# Patient Record
Sex: MALE | Race: WHITE | NOT HISPANIC OR LATINO | Employment: OTHER | URBAN - METROPOLITAN AREA
[De-identification: names, ages, dates, MRNs, and addresses within clinical notes are randomized per-mention and may not be internally consistent; named-entity substitution may affect disease eponyms.]

---

## 2020-05-18 DIAGNOSIS — Z00.6 EXAMINATION FOR NORMAL COMPARISON FOR CLINICAL RESEARCH: Primary | ICD-10-CM

## 2020-05-19 ENCOUNTER — OFFICE VISIT (OUTPATIENT)
Dept: PULMONOLOGY | Facility: CLINIC | Age: 70
End: 2020-05-19

## 2020-05-19 VITALS
DIASTOLIC BLOOD PRESSURE: 80 MMHG | SYSTOLIC BLOOD PRESSURE: 124 MMHG | TEMPERATURE: 97.5 F | BODY MASS INDEX: 30.49 KG/M2 | OXYGEN SATURATION: 100 % | WEIGHT: 213 LBS | HEART RATE: 74 BPM | HEIGHT: 70 IN

## 2020-05-19 DIAGNOSIS — K21.9 CHRONIC GERD: ICD-10-CM

## 2020-05-19 DIAGNOSIS — R93.89 ABNORMAL CHEST X-RAY: Primary | ICD-10-CM

## 2020-05-19 DIAGNOSIS — J30.89 SEASONAL AND PERENNIAL ALLERGIC RHINITIS: ICD-10-CM

## 2020-05-19 DIAGNOSIS — J30.2 SEASONAL AND PERENNIAL ALLERGIC RHINITIS: ICD-10-CM

## 2020-05-19 DIAGNOSIS — R06.09 DYSPNEA ON EXERTION: ICD-10-CM

## 2020-05-19 DIAGNOSIS — Z87.891 FORMER SMOKER: ICD-10-CM

## 2020-05-19 DIAGNOSIS — Z86.79 HISTORY OF CORONARY ARTERY DISEASE: ICD-10-CM

## 2020-05-19 DIAGNOSIS — G47.33 OSA (OBSTRUCTIVE SLEEP APNEA): ICD-10-CM

## 2020-05-19 PROCEDURE — 99204 OFFICE O/P NEW MOD 45 MIN: CPT | Performed by: INTERNAL MEDICINE

## 2020-05-19 PROCEDURE — 94375 RESPIRATORY FLOW VOLUME LOOP: CPT | Performed by: INTERNAL MEDICINE

## 2020-05-19 PROCEDURE — 94726 PLETHYSMOGRAPHY LUNG VOLUMES: CPT | Performed by: INTERNAL MEDICINE

## 2020-05-19 PROCEDURE — 94729 DIFFUSING CAPACITY: CPT | Performed by: INTERNAL MEDICINE

## 2020-05-19 RX ORDER — ROSUVASTATIN CALCIUM 20 MG/1
20 TABLET, COATED ORAL DAILY
COMMUNITY
Start: 2020-05-10

## 2020-05-19 RX ORDER — CARVEDILOL 6.25 MG/1
6.25 TABLET ORAL 2 TIMES DAILY
COMMUNITY
Start: 2020-04-27

## 2020-05-19 RX ORDER — LISINOPRIL 5 MG/1
5 TABLET ORAL 2 TIMES DAILY
COMMUNITY
Start: 2020-05-13

## 2020-05-19 RX ORDER — PANTOPRAZOLE SODIUM 40 MG/1
TABLET, DELAYED RELEASE ORAL
COMMUNITY
Start: 2020-04-27

## 2020-05-19 RX ORDER — TRAMADOL HYDROCHLORIDE 50 MG/1
50 TABLET ORAL 3 TIMES DAILY PRN
COMMUNITY
Start: 2020-04-27

## 2020-05-20 DIAGNOSIS — R06.09 DYSPNEA ON EXERTION: Primary | ICD-10-CM

## 2020-05-20 DIAGNOSIS — J84.9 ILD (INTERSTITIAL LUNG DISEASE) (HCC): ICD-10-CM

## 2020-05-20 PROBLEM — Z86.79 HISTORY OF CORONARY ARTERY DISEASE: Status: ACTIVE | Noted: 2020-05-20

## 2020-05-20 PROBLEM — G47.33 OSA (OBSTRUCTIVE SLEEP APNEA): Status: ACTIVE | Noted: 2020-05-20

## 2020-05-20 PROBLEM — J30.2 SEASONAL AND PERENNIAL ALLERGIC RHINITIS: Status: ACTIVE | Noted: 2020-05-20

## 2020-05-20 PROBLEM — K21.9 CHRONIC GERD: Status: ACTIVE | Noted: 2020-05-20

## 2020-05-20 PROBLEM — J30.89 SEASONAL AND PERENNIAL ALLERGIC RHINITIS: Status: ACTIVE | Noted: 2020-05-20

## 2020-05-20 PROBLEM — Z87.891 FORMER SMOKER: Status: ACTIVE | Noted: 2020-05-20

## 2020-05-20 NOTE — PROGRESS NOTES
PULMONARY  NOTE    Chief Complaint     Abnormal chest x-ray, dyspnea, cough, coronary artery disease, perennial rhinitis, reflux    History of Present Illness     70-year-old white male referred for evaluation of dyspnea and an abnormal chest x-ray.    He has a past history of tobacco abuse that consisted of up to 2 packs of cigarettes per day.  He stopped smoking completely in 1985.    He has dyspnea on exertion, not at rest.  Activities such as bending over or trying to go up 1 flight of stairs would result in shortness of breath.  He has had the symptoms for quite a while but he feels that they have gotten worse over the last year.    He has a daily cough, typically in the morning.  He rarely produces sputum.  He has had no hemoptysis.    He has sinus drainage and postnasal drip.  This is a daily issue and he typically takes Benadryl at night.    He also has regular reflux symptoms.  Control of his reflux symptoms is dependent upon regular use of pantoprazole.  He does not follow reflux precautions.    He underwent a chest x-ray which revealed a prominent interstitial pattern and he was referred to our office.    He also has a history of coronary artery disease and has had multiple stents in the past.  Indicates his last stent was about 5 to 6 years ago.  He denies chest pain or palpitations currently    Patient Active Problem List   Diagnosis   • Dyspnea on exertion   • Former smoker (None since 1985)   • History of coronary artery disease (Multiple stents)   • SAROJ (obstructive sleep apnea)   • GERD   • Perennial rhinitis     Allergies no known allergies    Current Outpatient Medications:   •  carvedilol (COREG) 6.25 MG tablet, Take 6.25 mg by mouth 2 (Two) Times a Day., Disp: , Rfl:   •  lisinopril (PRINIVIL,ZESTRIL) 5 MG tablet, Take 5 mg by mouth 2 (Two) Times a Day., Disp: , Rfl:   •  metFORMIN (GLUCOPHAGE) 500 MG tablet, Take 1,500 mg by mouth See Admin Instructions. Take 1 tablet by mouth in the morning  "and 2 at bedtime., Disp: , Rfl:   •  pantoprazole (PROTONIX) 40 MG EC tablet, TAKE 1 TABLET BY MOUTH ONCE DAILY 30 MINUTES PRIOR TO MORNING MEAL CHANGED TO 1 DAILY, Disp: , Rfl:   •  rosuvastatin (CRESTOR) 20 MG tablet, Take 20 mg by mouth Daily., Disp: , Rfl:   •  traMADol (ULTRAM) 50 MG tablet, Take 50 mg by mouth 3 (Three) Times a Day As Needed., Disp: , Rfl:   MEDICATION LIST AND ALLERGIES REVIEWED.    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    Review of Systems  ALSO REFER TO SCANNED ROS SHEET FROM SAME DATE.    /80   Pulse 74   Temp 97.5 °F (36.4 °C)   Ht 177.8 cm (70\")   Wt 96.6 kg (213 lb)   SpO2 100% Comment: resting, room air  BMI 30.56 kg/m²   Physical Exam   Constitutional: He is oriented to person, place, and time. He appears well-developed. No distress.   HENT:   Head: Normocephalic and atraumatic.   Neck: No thyromegaly present.   Cardiovascular: Normal rate, regular rhythm and normal heart sounds.   No murmur heard.  Pulmonary/Chest: Effort normal. No stridor.   No crackles or wheezes   Abdominal: Soft. Bowel sounds are normal.   Musculoskeletal: Normal range of motion. He exhibits no edema.   Lymphadenopathy:     He has no cervical " adenopathy.        Right: No supraclavicular and no epitrochlear adenopathy present.        Left: No supraclavicular and no epitrochlear adenopathy present.   Neurological: He is alert and oriented to person, place, and time.   Skin: Skin is warm and dry. He is not diaphoretic.   Psychiatric: He has a normal mood and affect. His behavior is normal.   Nursing note and vitals reviewed.      Results     Chest x-ray reveals a prominent interstitial pattern and borderline cardiomegaly    PFTs reveal no airway obstruction, no restriction, and a normal diffusion capacity    Problem List       ICD-10-CM ICD-9-CM   1. Abnormal chest x-ray R93.89 793.2   2. Dyspnea on exertion R06.09 786.09   3. Former smoker (None since 1985) Z87.891 V15.82   4. History of coronary artery disease (Multiple stents) Z86.79 V12.59   5. SAROJ (obstructive sleep apnea) G47.33 327.23   6. GERD K21.9 530.81   7. Perennial rhinitis J30.89 477.9    J30.2        Discussion     We reviewed his test results.  He does not have crackles on examination or other abnormal lung sounds.  PFTs are unremarkable with no evidence of obstructive or restrictive physiology.  I am not sure of the significance of his prominent interstitial pattern on chest x-ray.    I recommended an HRCT.  I recommended reflux precautions.    I recommended OTC medications for his sinus symptoms.    If he were to have a persistent, unrelenting cough, then I would consider a trial off of his lisinopril.  However I think reflux and upper airway cough syndrome are probably contributing factors at this point.    At this point, his dyspnea is unexplained.  He does not have evidence of obstructive or restrictive lung disease.  If his cardiac evaluation is unremarkable, then could consider a cardiopulmonary exercise test in the future    Toni Grant MD  Note electronically signed    CC: Nabil Price MD

## 2020-05-29 ENCOUNTER — TRANSCRIBE ORDERS (OUTPATIENT)
Dept: PULMONOLOGY | Facility: CLINIC | Age: 70
End: 2020-05-29

## 2020-06-04 ENCOUNTER — DOCUMENTATION (OUTPATIENT)
Dept: PULMONOLOGY | Facility: CLINIC | Age: 70
End: 2020-06-04

## 2020-06-04 DIAGNOSIS — R06.09 DYSPNEA ON EXERTION: ICD-10-CM

## 2020-06-04 NOTE — PROGRESS NOTES
Patient underwent a high-res CT scan of the chest at [facility] on 6/1/2020.  Unfortunately, I do not have those discs yet for my review.    The radiologist has interpreted a fibrotic pattern consistent with UIP describing both reticular disease and honeycombing.    I discussed this with the patient on the phone.  His prior PFTs were completely normal.  While he may have UIP/IPF I am not sure if that completely explains his shortness of breath given his normal PFTs.    At this point, I have suggested a CPX.  Also, we will get his images for my review.  Based on his CPX will decide if any further work-up is needed.    We will also discuss IPF therapy based on my review of his images.  We will need to get lab work, as well

## 2020-06-08 DIAGNOSIS — Z00.6 EXAMINATION FOR NORMAL COMPARISON FOR CLINICAL RESEARCH: Primary | ICD-10-CM

## 2020-06-12 DIAGNOSIS — R06.09 DYSPNEA ON EXERTION: Primary | ICD-10-CM

## 2025-03-26 PROBLEM — I25.10 CORONARY ARTERY DISEASE INVOLVING NATIVE CORONARY ARTERY OF NATIVE HEART WITHOUT ANGINA PECTORIS: Status: ACTIVE | Noted: 2025-03-26

## 2025-03-26 PROBLEM — E78.5 HYPERLIPIDEMIA LDL GOAL <70: Status: ACTIVE | Noted: 2025-03-26

## 2025-03-26 PROBLEM — I10 BENIGN ESSENTIAL HYPERTENSION: Status: ACTIVE | Noted: 2025-03-26

## 2025-03-26 NOTE — ASSESSMENT & PLAN NOTE
Continue rosuvastatin 10 mg once a day.    Orders:    Stress Test With Myocardial Perfusion One Day; Future    Duplex Aorta IVC Iliac Graft Limited CAR; Future    Duplex Lower Extremity Art / Grafts - Bilateral CAR; Future

## 2025-03-26 NOTE — PROGRESS NOTES
Cardiology Follow-Up Note     Name: Carlos Modi  :   1950  PCP: Nabil Price MD  Date:   2025  Department: [facility] CARDIOLOGY  [address]  [facility] [address]  Fax [phone]  Phone [phone]    Chief Complaint   Patient presents with    Hypertension    Hyperlipidemia    Coronary Artery Disease     Subjective     History of Present Illness  Carlos Modi is a 75 y.o. male who presents today for 3-month follow-up. Patient states has shortness of breath on exertion, gotten worse.Patient states not active due to shortness of breath.    CAD  Heart cath #7/10/22: Multivessel CAD, history of multiple stents, preserved LV function  CABG 2022  HTN  AAA  Abdominal ultrasound 2024: Infrarenal AAA 3.68 cm  CKD 2  2024: GFR 67  Renal Ultrasound 2017: No stenosis  Dilated aortic root  ECHO 2024: EF 52%, moderate tricuspid regurg, moderate pulmonary hypertension, aortic root mildly dilated 3.8 cm  HLD  2024: LDL 53  Carotid Ultrasound 2023: Less than 50% stenosis bilaterally  Diabetes type 2  2024: A1c 7.5  8.  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      Current Outpatient Medications:     aspirin 81 MG EC tablet, Take 1 tablet by mouth Daily., Disp: , Rfl:     carvedilol (COREG) 6.25 MG tablet, Take 1 tablet by mouth 2 (Two) Times a Day., Disp: , Rfl:     clopidogrel (PLAVIX) 75 MG tablet, Take 1 tablet by mouth Daily., Disp: , Rfl:     ferrous sulfate 324 (65 Fe) MG tablet delayed-release EC tablet, Take 1 tablet by mouth Daily With Breakfast., Disp: , Rfl:     glipizide (GLUCOTROL) 5 MG tablet, Take 1 tablet by mouth 2 (Two) Times a Day., Disp: , Rfl:     metFORMIN (GLUCOPHAGE) 500 MG tablet, Take 3 tablets by mouth See Admin Instructions. Take 1 tablet by mouth in the morning and 2 at bedtime., Disp: , Rfl:     multivitamin with minerals  "tablet tablet, Take 1 tablet by mouth Daily., Disp: , Rfl:     pantoprazole (PROTONIX) 40 MG EC tablet, 1 tablet Daily., Disp: , Rfl:     rosuvastatin (CRESTOR) 20 MG tablet, Take 20 mg by mouth Daily. (Patient taking differently: Take 0.5 tablets by mouth Daily.), Disp: , Rfl:     traMADol (ULTRAM) 50 MG tablet, Take 1 tablet by mouth 3 (Three) Times a Day As Needed., Disp: , Rfl:     vitamin D3 125 MCG (5000 UT) capsule capsule, Take 1 capsule by mouth Daily., Disp: , Rfl:     Objective     Vital Signs:  /71 (BP Location: Right arm, Patient Position: Sitting)   Pulse 73   Ht 180.3 cm (71\")   Wt 98.9 kg (218 lb 1.6 oz)   BMI 30.42 kg/m²   Estimated body mass index is 30.42 kg/m² as calculated from the following:    Height as of this encounter: 180.3 cm (71\").    Weight as of this encounter: 98.9 kg (218 lb 1.6 oz).             Vitals reviewed.   Constitutional:       Appearance: Normal and healthy appearance.   Eyes:      Pupils: Pupils are equal, round, and reactive to light.   Pulmonary:      Effort: Pulmonary effort is normal.   Chest:      Chest wall: Not tender to palpatation.   Cardiovascular:      PMI at left midclavicular line. Normal rate. Regular rhythm.      No gallop.    Pulses:     Decreased pulses.      Dorsalis pedis: 2+ bilaterally.     Posterior tibial: 0 bilaterally.  Edema:     Peripheral edema absent.   Skin:     General: Skin is warm.   Psychiatric:         Behavior: Behavior is cooperative.              Data Review:   No results found for: \"GLUCOSE\", \"BUN\", \"CREATININE\", \"EGFRIFNONA\", \"EGFRIFAFRI\", \"BCR\", \"K\", \"CO2\", \"CALCIUM\", \"ALBUMIN\", \"BILIRUBIN\", \"AST\", \"ALT\"  No results found for: \"CHOL\", \"CHLPL\", \"TRIG\", \"HDL\", \"LDL\", \"LDLDIRECT\"   No results found for: \"WBC\", \"RBC\", \"HGB\", \"HCT\", \"MCV\", \"PLT\"  No results found for: \"TSH\"  No results found for: \"HGBA1C\"  No results found for: \"INR\", \"PROTIME\"    Labs dated 2/28/2025 CBC normal except WBC 11.8.  BMP glucose 142 GFR 61.  LDL 61 " HDL 38.  LFTs normal.  Triglyceride 174    Assessment and Plan     Assessment & Plan  Coronary artery disease involving native coronary artery of native heart with refractory angina pectoris  Continue Aspirin and Plavix 75 mg once a day.  Will do Lexiscan.  Orders:    Stress Test With Myocardial Perfusion One Day; Future    Duplex Aorta IVC Iliac Graft Limited CAR; Future    Duplex Lower Extremity Art / Grafts - Bilateral CAR; Future    Benign essential hypertension  Continue carvedilol 6.25 mg p.o. twice daily    Orders:    Stress Test With Myocardial Perfusion One Day; Future    Duplex Aorta IVC Iliac Graft Limited CAR; Future    Duplex Lower Extremity Art / Grafts - Bilateral CAR; Future    Hyperlipidemia LDL goal <70  Continue rosuvastatin 10 mg once a day.    Orders:    Stress Test With Myocardial Perfusion One Day; Future    Duplex Aorta IVC Iliac Graft Limited CAR; Future    Duplex Lower Extremity Art / Grafts - Bilateral CAR; Future    Infrarenal abdominal aortic aneurysm (AAA) without rupture  Repeat abdominal duplex for AAA. Both legs reny in calf muscles when walking.  Orders:    Stress Test With Myocardial Perfusion One Day; Future    Duplex Aorta IVC Iliac Graft Limited CAR; Future    Duplex Lower Extremity Art / Grafts - Bilateral CAR; Future    Left leg claudication  Will do arterial duplex.  Orders:    Stress Test With Myocardial Perfusion One Day; Future    Duplex Aorta IVC Iliac Graft Limited CAR; Future    Duplex Lower Extremity Art / Grafts - Bilateral CAR; Future      Advised to continue current cardiac medications. Please notify of any issues. Discussed with the patient compliance with medical management and follow-up.     Follow Up  No follow-ups on file.    Call if you have any significant symptoms or go to the [facility] Emergency room if possible.     Charanjit Galo MD, FACC,[facility].  [facility]    Part of this note may be an electronic  transcription/translation of spoken language to printed text using the Dragon Dictation System.

## 2025-03-26 NOTE — ASSESSMENT & PLAN NOTE
Continue carvedilol 6.25 mg p.o. twice daily    Orders:    Stress Test With Myocardial Perfusion One Day; Future    Duplex Aorta IVC Iliac Graft Limited CAR; Future    Duplex Lower Extremity Art / Grafts - Bilateral CAR; Future

## 2025-03-26 NOTE — ASSESSMENT & PLAN NOTE
Continue Aspirin and Plavix 75 mg once a day.  Will do Lexiscan.  Orders:    Stress Test With Myocardial Perfusion One Day; Future    Duplex Aorta IVC Iliac Graft Limited CAR; Future    Duplex Lower Extremity Art / Grafts - Bilateral CAR; Future

## 2025-03-27 ENCOUNTER — OFFICE VISIT (OUTPATIENT)
Age: 75
End: 2025-03-27
Payer: MEDICARE

## 2025-03-27 VITALS
DIASTOLIC BLOOD PRESSURE: 71 MMHG | BODY MASS INDEX: 30.53 KG/M2 | SYSTOLIC BLOOD PRESSURE: 145 MMHG | HEART RATE: 73 BPM | WEIGHT: 218.1 LBS | HEIGHT: 71 IN

## 2025-03-27 DIAGNOSIS — I73.9 LEFT LEG CLAUDICATION: ICD-10-CM

## 2025-03-27 DIAGNOSIS — I71.43 INFRARENAL ABDOMINAL AORTIC ANEURYSM (AAA) WITHOUT RUPTURE: ICD-10-CM

## 2025-03-27 DIAGNOSIS — I25.112 CORONARY ARTERY DISEASE INVOLVING NATIVE CORONARY ARTERY OF NATIVE HEART WITH REFRACTORY ANGINA PECTORIS: Primary | ICD-10-CM

## 2025-03-27 DIAGNOSIS — E78.5 HYPERLIPIDEMIA LDL GOAL <70: ICD-10-CM

## 2025-03-27 DIAGNOSIS — I10 BENIGN ESSENTIAL HYPERTENSION: ICD-10-CM

## 2025-03-27 PROBLEM — I71.40 AAA (ABDOMINAL AORTIC ANEURYSM) WITHOUT RUPTURE: Status: ACTIVE | Noted: 2025-03-27

## 2025-03-27 RX ORDER — GLIPIZIDE 5 MG/1
1 TABLET ORAL 2 TIMES DAILY
COMMUNITY

## 2025-03-27 RX ORDER — CLOPIDOGREL BISULFATE 75 MG/1
1 TABLET ORAL DAILY
COMMUNITY

## 2025-03-27 RX ORDER — ASPIRIN 81 MG/1
81 TABLET ORAL DAILY
COMMUNITY

## 2025-03-27 RX ORDER — FERROUS SULFATE 324(65)MG
324 TABLET, DELAYED RELEASE (ENTERIC COATED) ORAL
COMMUNITY

## 2025-03-27 RX ORDER — MULTIPLE VITAMINS W/ MINERALS TAB 9MG-400MCG
1 TAB ORAL DAILY
COMMUNITY

## 2025-03-27 NOTE — ASSESSMENT & PLAN NOTE
Will do arterial duplex.  Orders:    Stress Test With Myocardial Perfusion One Day; Future    Duplex Aorta IVC Iliac Graft Limited CAR; Future    Duplex Lower Extremity Art / Grafts - Bilateral CAR; Future

## 2025-03-27 NOTE — ASSESSMENT & PLAN NOTE
Repeat abdominal duplex for AAA. Both legs reny in calf muscles when walking.  Orders:    Stress Test With Myocardial Perfusion One Day; Future    Duplex Aorta IVC Iliac Graft Limited CAR; Future    Duplex Lower Extremity Art / Grafts - Bilateral CAR; Future

## 2025-04-28 ENCOUNTER — HOSPITAL ENCOUNTER (OUTPATIENT)
Dept: CARDIOLOGY | Facility: HOSPITAL | Age: 75
Discharge: HOME | End: 2025-04-28
Payer: MEDICARE

## 2025-04-28 VITALS — BODY MASS INDEX: 30.52 KG/M2 | WEIGHT: 218.03 LBS | HEIGHT: 71 IN

## 2025-04-28 DIAGNOSIS — I73.9 LEFT LEG CLAUDICATION: ICD-10-CM

## 2025-04-28 DIAGNOSIS — I10 BENIGN ESSENTIAL HYPERTENSION: ICD-10-CM

## 2025-04-28 DIAGNOSIS — I25.112 CORONARY ARTERY DISEASE INVOLVING NATIVE CORONARY ARTERY OF NATIVE HEART WITH REFRACTORY ANGINA PECTORIS: ICD-10-CM

## 2025-04-28 DIAGNOSIS — I71.43 INFRARENAL ABDOMINAL AORTIC ANEURYSM (AAA) WITHOUT RUPTURE: ICD-10-CM

## 2025-04-28 DIAGNOSIS — E78.5 HYPERLIPIDEMIA LDL GOAL <70: ICD-10-CM

## 2025-04-28 LAB
BH CV REST NUCLEAR ISOTOPE DOSE: 9.9 MCI
BH CV STRESS BP STAGE 2: NORMAL
BH CV STRESS BP STAGE 4: NORMAL
BH CV STRESS COMMENTS STAGE 1: NORMAL
BH CV STRESS DOSE REGADENOSON STAGE 1: 0.4
BH CV STRESS DURATION MIN STAGE 1: 1
BH CV STRESS DURATION MIN STAGE 2: 1
BH CV STRESS DURATION MIN STAGE 3: 1
BH CV STRESS DURATION MIN STAGE 4: 1
BH CV STRESS DURATION SEC STAGE 1: 0
BH CV STRESS DURATION SEC STAGE 2: 0
BH CV STRESS DURATION SEC STAGE 3: 0
BH CV STRESS DURATION SEC STAGE 4: 0
BH CV STRESS HR STAGE 1: 71
BH CV STRESS HR STAGE 2: 96
BH CV STRESS HR STAGE 3: 83
BH CV STRESS HR STAGE 4: 84
BH CV STRESS NUCLEAR ISOTOPE DOSE: 32.9 MCI
BH CV STRESS O2 STAGE 1: 99
BH CV STRESS O2 STAGE 2: 99
BH CV STRESS O2 STAGE 3: 99
BH CV STRESS O2 STAGE 4: 99
BH CV STRESS PROTOCOL 1: NORMAL
BH CV STRESS RECOVERY BP: NORMAL MMHG
BH CV STRESS RECOVERY HR: 78 BPM
BH CV STRESS RECOVERY O2: 98 %
BH CV STRESS STAGE 1: 1
BH CV STRESS STAGE 2: 2
BH CV STRESS STAGE 3: 3
BH CV STRESS STAGE 4: 4
MAXIMAL PREDICTED HEART RATE: 145 BPM
PERCENT MAX PREDICTED HR: 66.9 %
SPECT HRT GATED+EF W RNC IV: 60 %
STRESS BASELINE BP: NORMAL MMHG
STRESS BASELINE HR: 64 BPM
STRESS O2 SAT REST: 98 %
STRESS PERCENT HR: 79 %
STRESS POST ESTIMATED WORKLOAD: 1 METS
STRESS POST EXERCISE DUR MIN: 4 MIN
STRESS POST EXERCISE DUR SEC: 0 SEC
STRESS POST O2 SAT PEAK: 99 %
STRESS POST PEAK BP: NORMAL MMHG
STRESS POST PEAK HR: 97 BPM
STRESS TARGET HR: 123 BPM

## 2025-04-28 PROCEDURE — 25010000002 REGADENOSON 0.4 MG/5ML SOLUTION: Performed by: INTERNAL MEDICINE

## 2025-04-28 PROCEDURE — A9500 TC99M SESTAMIBI: HCPCS | Performed by: INTERNAL MEDICINE

## 2025-04-28 PROCEDURE — 78452 HT MUSCLE IMAGE SPECT MULT: CPT | Performed by: INTERNAL MEDICINE

## 2025-04-28 PROCEDURE — 34310000005 TECHNETIUM SESTAMIBI: Performed by: INTERNAL MEDICINE

## 2025-04-28 PROCEDURE — 93018 CV STRESS TEST I&R ONLY: CPT | Performed by: INTERNAL MEDICINE

## 2025-04-28 PROCEDURE — 93016 CV STRESS TEST SUPVJ ONLY: CPT | Performed by: INTERNAL MEDICINE

## 2025-04-28 PROCEDURE — 78452 HT MUSCLE IMAGE SPECT MULT: CPT

## 2025-04-28 PROCEDURE — 93017 CV STRESS TEST TRACING ONLY: CPT

## 2025-04-28 RX ORDER — REGADENOSON 0.08 MG/ML
0.4 INJECTION, SOLUTION INTRAVENOUS ONCE
Status: COMPLETED | OUTPATIENT
Start: 2025-04-28 | End: 2025-04-28

## 2025-04-28 RX ADMIN — TECHNETIUM TC 99M SESTAMIBI 1 DOSE: 1 INJECTION INTRAVENOUS at 09:10

## 2025-04-28 RX ADMIN — TECHNETIUM TC 99M SESTAMIBI 1 DOSE: 1 INJECTION INTRAVENOUS at 07:40

## 2025-04-28 RX ADMIN — REGADENOSON 0.4 MG: 0.08 INJECTION, SOLUTION INTRAVENOUS at 09:08

## 2025-05-01 ENCOUNTER — HOSPITAL ENCOUNTER (OUTPATIENT)
Dept: CARDIOLOGY | Facility: HOSPITAL | Age: 75
Discharge: HOME | End: 2025-05-01
Admitting: INTERNAL MEDICINE
Payer: MEDICARE

## 2025-05-01 VITALS — BODY MASS INDEX: 30.52 KG/M2 | HEIGHT: 71 IN | WEIGHT: 218 LBS

## 2025-05-01 DIAGNOSIS — I10 BENIGN ESSENTIAL HYPERTENSION: ICD-10-CM

## 2025-05-01 DIAGNOSIS — E78.5 HYPERLIPIDEMIA LDL GOAL <70: ICD-10-CM

## 2025-05-01 DIAGNOSIS — I25.112 CORONARY ARTERY DISEASE INVOLVING NATIVE CORONARY ARTERY OF NATIVE HEART WITH REFRACTORY ANGINA PECTORIS: ICD-10-CM

## 2025-05-01 DIAGNOSIS — I73.9 LEFT LEG CLAUDICATION: ICD-10-CM

## 2025-05-01 DIAGNOSIS — I71.43 INFRARENAL ABDOMINAL AORTIC ANEURYSM (AAA) WITHOUT RUPTURE: ICD-10-CM

## 2025-05-01 LAB
ABDOMINAL DIST AORTA AP: 2.1 CM
ABDOMINAL DIST AORTA TRANS: 1.7 CM
ABDOMINAL DIST AORTA VEL: 60.4 CM/S
ABDOMINAL LT COM ILIAC AP: 1.1 CM
ABDOMINAL LT COM ILIAC TRANS: 1 CM
ABDOMINAL LT COM ILIAC VEL: 242.8 CM/S
ABDOMINAL LT EXT ILIAC VEL: 97.3 CM/S
ABDOMINAL MID AORTA AP: 1.6 CM
ABDOMINAL MID AORTA TRANS: 1.9 CM
ABDOMINAL MID AORTA VEL: 73 CM/S
ABDOMINAL PROX AORTA AP: 2.5 CM
ABDOMINAL PROX AORTA TRANS: 2.3 CM
ABDOMINAL PROX AORTA VEL: 45.1 CM/S
ABDOMINAL RT COM ILIAC AP: 1 CM
ABDOMINAL RT COM ILIAC TRANS: 0.9 CM
ABDOMINAL RT COM ILIAC VEL: 237.9 CM/S
ABDOMINAL RT EXT ILIAC VEL: 108 CM/S
BH CV VAS ABD AO LT EXTERNAL ILIAC AP: 0.8 CM
BH CV VAS ABD AO LT EXTERNAL ILIAC TRANS: 1 CM
BH CV VAS ABD AO RT EXTERNAL ILIAC AP: 1 CM
BH CV VAS ABD AO RT EXTERNAL ILIAC TRANS: 0.9 CM

## 2025-05-01 PROCEDURE — 93979 VASCULAR STUDY: CPT

## 2025-05-15 ENCOUNTER — HOSPITAL ENCOUNTER (OUTPATIENT)
Facility: HOSPITAL | Age: 75
Discharge: HOME | End: 2025-05-15
Admitting: INTERNAL MEDICINE
Payer: MEDICARE

## 2025-05-15 DIAGNOSIS — I71.43 INFRARENAL ABDOMINAL AORTIC ANEURYSM (AAA) WITHOUT RUPTURE: ICD-10-CM

## 2025-05-15 DIAGNOSIS — E78.5 HYPERLIPIDEMIA LDL GOAL <70: ICD-10-CM

## 2025-05-15 DIAGNOSIS — I73.9 LEFT LEG CLAUDICATION: ICD-10-CM

## 2025-05-15 DIAGNOSIS — I25.112 CORONARY ARTERY DISEASE INVOLVING NATIVE CORONARY ARTERY OF NATIVE HEART WITH REFRACTORY ANGINA PECTORIS: ICD-10-CM

## 2025-05-15 DIAGNOSIS — I10 BENIGN ESSENTIAL HYPERTENSION: ICD-10-CM

## 2025-05-15 LAB
BH CV GRAFT BRACHIAL PRESSURE LEFT: 141 MMHG
BH CV GRAFT BRACHIAL PRESSURE RIGHT: 142 MMHG
BH CV LEA LEFT ANT TIBIAL A DISTAL PSV: 90.1 CM/S
BH CV LEA LEFT CFA DISTAL PSV: 163 CM/S
BH CV LEA LEFT DFA PROX PSV: 59.3 CM/S
BH CV LEA LEFT DPA PRESSURE: 200 MMHG
BH CV LEA LEFT PERONEAL  MID PSV: 51 CM/S
BH CV LEA LEFT POPITEAL A  DISTAL PSV: 38.6 CM/S
BH CV LEA LEFT POPITEAL A  PROX PSV: 58.8 CM/S
BH CV LEA LEFT PTA DISTAL PSV: 65.5 CM/S
BH CV LEA LEFT PTA PRESSURE: >230 MMHG
BH CV LEA LEFT SFA DISTAL PSV: 70.2 CM/S
BH CV LEA LEFT SFA MID PSV: 116 CM/S
BH CV LEA LEFT SFA PROX PSV: 146 CM/S
BH CV LEA LEFT TIBEOPERONEAL PSV: 37.9 CM/S
BH CV LEA RIGHT ANT TIBIAL A DISTAL PSV: 140 CM/S
BH CV LEA RIGHT CFA DISTAL PSV: 151 CM/S
BH CV LEA RIGHT DFA PROX PSV: 50.6 CM/S
BH CV LEA RIGHT DPA PRESSURE: >230 MMHG
BH CV LEA RIGHT PERONEAL  MID PSV: 50.5 CM/S
BH CV LEA RIGHT POPITEAL A  DISTAL PSV: 52.2 CM/S
BH CV LEA RIGHT POPITEAL A  PROX PSV: 57.6 CM/S
BH CV LEA RIGHT PTA DISTAL PSV: 73 CM/S
BH CV LEA RIGHT PTA PRESSURE: >230 MMHG
BH CV LEA RIGHT SFA DISTAL PSV: 114 CM/S
BH CV LEA RIGHT SFA MID PSV: 94.9 CM/S
BH CV LEA RIGHT SFA PROX PSV: 127 CM/S
BH CV LEA RIGHT TIBEOPERONEAL PSV: 61.5 CM/S
BH CV LOWER ARTERIAL LEFT ABI RATIO: ABNORMAL
BH CV LOWER ARTERIAL RIGHT ABI RATIO: ABNORMAL
LEFT GROIN CFA SYS: 163 CM/SEC
RIGHT GROIN CFA SYS: 151 CM/SEC

## 2025-05-15 PROCEDURE — 93925 LOWER EXTREMITY STUDY: CPT

## 2025-05-22 PROBLEM — I73.9 PERIPHERAL ARTERY DISEASE: Status: ACTIVE | Noted: 2025-05-22

## 2025-05-22 NOTE — H&P (VIEW-ONLY)
Cardiology Follow-Up Note     Name: Carlos Modi  :   1950  PCP: Nabil Price MD  Date:   2025  Department: [facility] [place] [facility] CARDIOLOGY  [address]  [facility]ly [facility] [address]  Fax [phone]  Phone [phone]    Chief Complaint   Patient presents with    Coronary Artery Disease    Hyperlipidemia     Problem list:  CAD    Heart cath #7/10/22: Multivessel CAD, history of multiple stents, preserved LV function  CABG 2022 Lexiscan revealing EF 60% small to moderate infarct located in the basal inferior lateral wall of the left ventricle with mild to moderate nilton-infarct ischemia.        HTN  AAA  Abdominal ultrasound 2024: Infrarenal AAA 3.68 cm  2025 when the AAA was not visible.    Bilateral iliac stenosis 50 to 75%.  CKD 2  2024: GFR 67  Renal Ultrasound 2017: No stenosis  Dilated aortic root  ECHO 2024: EF 52%, moderate tricuspid regurg, moderate pulmonary hypertension, aortic root mildly dilated 3.8 cm  HLD  2024: LDL 53  Carotid Ultrasound 2023: Less than 50% stenosis bilaterally  Diabetes type 2  2024: A1c 7.5  8.  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    9.  Peripheral arterial disease   5/15/2025 arterial duplex semination reveal severe calcification of the arteries below the knee with greater than 50% stenosis of the tibioperoneal trunk and greater than 70% stenosis of the right anterior tibial artery and less than 50% stenosis of the left anterior tibial artery, posterior tibial artery, peroneal artery.    Subjective     History of Present Illness  Carlos Modi is a 75 y.o. male who presents today for follow up testing, Patient states gets chest tightness moderate last few minutes and have to rest to feel better. Gets  shortness of breath on mild exertion.     Past Medical History:   Diagnosis Date    Aortic root dilation      Arthritis     Benign essential hypertension     CAD in native artery     Claudication     Diabetes mellitus, type II     DM (diabetes mellitus)     Erectile dysfunction     Gout     Heart disease     Hypercholesterolemia     Left ventricular hypertrophy     Mitral regurgitation     Myocardial infarction     SAROJ (obstructive sleep apnea)     Seizures     Sleep apnea     Tricuspid regurgitation       Past Surgical History:   Procedure Laterality Date    BACK SURGERY      CARDIAC CATHETERIZATION  05/10/2022    [facility] [place] [facility]. IOP: CAD, HX Stenting, Angina. EF 75%. Multivessel  CAD, Preserved LVF.    CARDIAC CATHETERIZATION  02/10/2017    [facility] [place] STEFANIA/PI: known CAD, angina, abn stress study/EF 55%.  CAD with patent stent to LAD and CX. Chronically occluded RCA.    CARDIAC CATHETERIZATION  04/30/2015    [facility] [place] STEFANIA/PI: severe SoA, abn PET scan/Multivessel CAD.  Preserved LVF.    CARDIAC CATHETERIZATION  06/27/2012    IOP: SOB/mod size mod to sev ischem. EF 65%. Multivessel  CAD. Patent stent to the LAD and circum. Obtuse marginal has  40% in-stent stenosis.    CARDIAC CATHETERIZATION  10/14/2011    PTCA of circumflex. Stent could not be placed.    CARDIAC CATHETERIZATION  10/26/2010    MQ. Xience stent to Obtuse Marginal    CARDIAC CATHETERIZATION  09/01/2005    Stent of obtuse marginal branch and right coronary artery. EF  60%    CORONARY ARTERY BYPASS GRAFT  05/17/2022    [facility] Dr [facility]. EF 55%. LV Hypertrophy, Abnormal Relaxation  Pattern.    ENDOVENOUS ABLATION SAPHENOUS VEIN W/ LASER  02/11/2016    Left and Right greater saphenous vein RF ablation was  completed without difficulty..    VEIN SURGERY         Current Outpatient Medications:     aspirin 81 MG EC tablet, Take 1 tablet by mouth Daily., Disp: , Rfl:     carvedilol (COREG) 6.25 MG tablet, Take 1 tablet by mouth 2 (Two) Times a Day., Disp: , Rfl:     clopidogrel (PLAVIX) 75 MG tablet, Take 1 tablet by mouth Daily., Disp: , Rfl:     ferrous sulfate 324 (65 Fe) MG tablet  "delayed-release EC tablet, Take 1 tablet by mouth Daily With Breakfast. (Patient taking differently: Take 1 tablet by mouth 2 (Two) Times a Day With Meals.), Disp: , Rfl:     glipizide (GLUCOTROL) 5 MG tablet, Take 1 tablet by mouth 2 (Two) Times a Day., Disp: , Rfl:     metFORMIN (GLUCOPHAGE) 500 MG tablet, Take 3 tablets by mouth See Admin Instructions. Take 1 tablet by mouth in the morning and 2 at bedtime. (Patient taking differently: Take 1 tablet by mouth 2 (Two) Times a Day With Meals. Take 1 tablet by mouth in the morning and 2 at bedtime.), Disp: , Rfl:     multivitamin with minerals tablet tablet, Take 1 tablet by mouth Daily., Disp: , Rfl:     pantoprazole (PROTONIX) 40 MG EC tablet, 1 tablet Daily., Disp: , Rfl:     rosuvastatin (CRESTOR) 20 MG tablet, Take 20 mg by mouth Daily. (Patient taking differently: Take 0.5 tablets by mouth Daily.), Disp: , Rfl:     traMADol (ULTRAM) 50 MG tablet, Take 1 tablet by mouth 3 (Three) Times a Day As Needed., Disp: , Rfl:     Turmeric (QC TUMERIC COMPLEX PO), Take 450 mg by mouth Daily., Disp: , Rfl:     vitamin D3 125 MCG (5000 UT) capsule capsule, Take 1 capsule by mouth Daily., Disp: , Rfl:     Objective     Vital Signs:  /77 (BP Location: Right arm, Patient Position: Sitting)   Pulse 68   Ht 180.3 cm (71\")   Wt 98.4 kg (216 lb 14.4 oz)   BMI 30.25 kg/m²   Estimated body mass index is 30.25 kg/m² as calculated from the following:    Height as of this encounter: 180.3 cm (71\").    Weight as of this encounter: 98.4 kg (216 lb 14.4 oz).             Cardiovascular:      PMI at left midclavicular line. Normal rate. Regular rhythm. Normal S1. Normal S2.       Murmurs: There is no murmur.      No gallop.  No click. No rub.   Pulses:     Intact distal pulses.   Edema:     Peripheral edema absent.              Data Review:   No results found for: \"GLUCOSE\", \"BUN\", \"CREATININE\", \"EGFRIFNONA\", \"EGFRIFAFRI\", \"BCR\", \"K\", \"CO2\", \"CALCIUM\", \"ALBUMIN\", \"BILIRUBIN\", " "\"AST\", \"ALT\"  No results found for: \"CHOL\", \"CHLPL\", \"TRIG\", \"HDL\", \"LDL\", \"LDLDIRECT\"   No results found for: \"WBC\", \"RBC\", \"HGB\", \"HCT\", \"MCV\", \"PLT\"  No results found for: \"TSH\"  No results found for: \"HGBA1C\"  No results found for: \"INR\", \"PROTIME\"        Assessment and Plan     Assessment & Plan  Coronary artery disease involving native coronary artery of native heart with refractory angina pectoris  Coronary Artery Disease (OPTIONAL): Coronary artery disease is worse . Patient was explained tests. Patient has class 3 angina.   Discussed with patient in detail about the findings of nuclear scan, the patient was explained about the test, she understand that nuclear medicine is injected that tags the red blood cells, the nuclear camera then takes the images, understanding that whenever the blood goes the nuclear medicine will go and would brighten up the image of the heart, the area that is not picked up by the nuclear medicine suggest either fixed or reversible defect explaining how that works.  The patient also understand GXT versus adenosine injection which is Lexiscan.  The patient fully understand that this is a screening to and not a diagnostic tool.  The accuracy of this test is 85% depending upon the images.  If patient's symptoms persist she will need a diagnostic test which is cardiac catheterization.  This test does not rule out coronary artery disease.  The patient needs to continue risk factor modification.  Discussed with patient options in detail.  The patient will see us sooner if the symptoms persist.   Continue current treatment regimen. Dietary sodium restriction. Weight loss. Regular aerobic exercise.  Cardiac status will be reassessed in 3 months.  Discuss with patient about the option of Left heart cath.  I have discussed with the patient in detail about his cardiac authorization and angioplasty risk, I have explained to him there is risk that includes intubation, dye allergy, dye damage to " the kidneys, bleeding, damage to artery, vein and nerve, risk of heart attack, emergency bypass surgery, tamponade, loss of stent, dissection and death.     Orders:    Obtain Informed Consent; Standing    Clip Bilateral Groins; Standing    Obtain Informed Consent in Pre-Op; Standing    Provide Patient With Hydration Protocol Handout; Standing    CBC & Differential; Standing    Basic Metabolic Panel; Standing    ECG 12 Lead Pre-Op / Pre-Procedure; Standing    NPO After Midnight    sodium chloride 0.9 % bolus 295.2 mL    Benign essential hypertension  Hypertension is stable and controlled  Continue current treatment regimen.  Dietary sodium restriction.  Weight loss.  Blood pressure will be reassessed in 3 months.  Continue carvedilol 6.25 mg twice a day  Orders:    Obtain Informed Consent; Standing    Clip Bilateral Groins; Standing    Obtain Informed Consent in Pre-Op; Standing    Provide Patient With Hydration Protocol Handout; Standing    CBC & Differential; Standing    Basic Metabolic Panel; Standing    ECG 12 Lead Pre-Op / Pre-Procedure; Standing    NPO After Midnight    sodium chloride 0.9 % bolus 295.2 mL    Hyperlipidemia LDL goal <70   Lipid abnormalities are stable    Plan:  Continue same medication/s without change.      Discussed medication dosage, use, side effects, and goals of treatment in detail.    Counseled patient on lifestyle modifications to help control hyperlipidemia.   Advised patient to exercise for 150 minutes weekly. (30 minute brisk walk, 5 days a week for example)    Patient Treatment Goals:   LDL goal is less than 55    Followup in 3 months.  Continue rosuvastatin 20 mg once a day  Orders:    Obtain Informed Consent; Standing    Clip Bilateral Groins; Standing    Obtain Informed Consent in Pre-Op; Standing    Provide Patient With Hydration Protocol Handout; Standing    CBC & Differential; Standing    Basic Metabolic Panel; Standing    ECG 12 Lead Pre-Op / Pre-Procedure; Standing    NPO  After Midnight    sodium chloride 0.9 % bolus 295.2 mL    Infrarenal abdominal aortic aneurysm (AAA) without rupture  The abdominal arterial duplex did not reveal AAA at this time.  May need to do CT scan if needed.  Orders:    Obtain Informed Consent; Standing    Clip Bilateral Groins; Standing    Obtain Informed Consent in Pre-Op; Standing    Provide Patient With Hydration Protocol Handout; Standing    CBC & Differential; Standing    Basic Metabolic Panel; Standing    ECG 12 Lead Pre-Op / Pre-Procedure; Standing    NPO After Midnight    sodium chloride 0.9 % bolus 295.2 mL    Peripheral artery disease  The patient has severe peripheral arterial disease of the lower extremities bilaterally below the knee the arteries are calcified consistent with diabetic vessels.  Aggressive diabetes management including going on semaglutide like Ozempic.  Long discussion with the patient, he has tried Ozempic in the past that is too expensive, also tried Farxiga is too expensive and Xarelto 2.5 mg twice a day to lower the risk of limb loss is also too expensive.  The patient fully understand the benefit of these medications that we have discussed in detail.  Orders:    Obtain Informed Consent; Standing    Clip Bilateral Groins; Standing    Obtain Informed Consent in Pre-Op; Standing    Provide Patient With Hydration Protocol Handout; Standing    CBC & Differential; Standing    Basic Metabolic Panel; Standing    ECG 12 Lead Pre-Op / Pre-Procedure; Standing    NPO After Midnight    sodium chloride 0.9 % bolus 295.2 mL      Advised to continue current cardiac medications. Please notify of any issues. Discussed with the patient compliance with medical management and follow-up.     Follow Up  Return in about 3 months (around 8/23/2025).    Call if you have any significant symptoms or go to the [facility] if possible.     Charanjit Galo MD, FACC,[facility].  [facility] [facility]    Part of this note  may be an electronic transcription/translation of spoken language to printed text using the Dragon Dictation System.

## 2025-05-22 NOTE — ASSESSMENT & PLAN NOTE
The abdominal arterial duplex did not reveal AAA at this time.  May need to do CT scan if needed.  Orders:    Obtain Informed Consent; Standing    Clip Bilateral Groins; Standing    Obtain Informed Consent in Pre-Op; Standing    Provide Patient With Hydration Protocol Handout; Standing    CBC & Differential; Standing    Basic Metabolic Panel; Standing    ECG 12 Lead Pre-Op / Pre-Procedure; Standing    NPO After Midnight    sodium chloride 0.9 % bolus 295.2 mL

## 2025-05-22 NOTE — PROGRESS NOTES
Cardiology Follow-Up Note     Name: Carlos Modi  :   1950  PCP: Nabil Price MD  Date:   2025  Department: [place] [facility] CARDIOLOGY  [address]  [facility] [address]  Fax [phone]  Phone [phone]    Chief Complaint   Patient presents with    Coronary Artery Disease    Hyperlipidemia     Problem list:  CAD    Heart cath #7/10/22: Multivessel CAD, history of multiple stents, preserved LV function  CABG 2022 Lexiscan revealing EF 60% small to moderate infarct located in the basal inferior lateral wall of the left ventricle with mild to moderate nilton-infarct ischemia.        HTN  AAA  Abdominal ultrasound 2024: Infrarenal AAA 3.68 cm  2025 when the AAA was not visible.    Bilateral iliac stenosis 50 to 75%.  CKD 2  2024: GFR 67  Renal Ultrasound 2017: No stenosis  Dilated aortic root  ECHO 2024: EF 52%, moderate tricuspid regurg, moderate pulmonary hypertension, aortic root mildly dilated 3.8 cm  HLD  2024: LDL 53  Carotid Ultrasound 2023: Less than 50% stenosis bilaterally  Diabetes type 2  2024: A1c 7.5  8.  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    9.  Peripheral arterial disease   5/15/2025 arterial duplex semination reveal severe calcification of the arteries below the knee with greater than 50% stenosis of the tibioperoneal trunk and greater than 70% stenosis of the right anterior tibial artery and less than 50% stenosis of the left anterior tibial artery, posterior tibial artery, peroneal artery.    Subjective     History of Present Illness  Carlos Modi is a 75 y.o. male who presents today for follow up testing, Patient states gets chest tightness moderate last few minutes and have to rest to feel better. Gets  shortness of breath on mild exertion.     Past Medical History:   Diagnosis Date    Aortic root dilation      Arthritis     Benign essential hypertension     CAD in native artery     Claudication     Diabetes mellitus, type II     DM (diabetes mellitus)     Erectile dysfunction     Gout     Heart disease     Hypercholesterolemia     Left ventricular hypertrophy     Mitral regurgitation     Myocardial infarction     SAROJ (obstructive sleep apnea)     Seizures     Sleep apnea     Tricuspid regurgitation       Past Surgical History:   Procedure Laterality Date    BACK SURGERY      CARDIAC CATHETERIZATION  05/10/2022    [facility]. IOP: CAD, HX Stenting, Angina. EF 75%. Multivessel  CAD, Preserved LVF.    CARDIAC CATHETERIZATION  02/10/2017    [facility] STEFANIA/PI: known CAD, angina, abn stress study/EF 55%.  CAD with patent stent to LAD and CX. Chronically occluded RCA.    CARDIAC CATHETERIZATION  04/30/2015    [facility] STEFANIA/PI: severe SoA, abn PET scan/Multivessel CAD.  Preserved LVF.    CARDIAC CATHETERIZATION  06/27/2012    IOP: SOB/mod size mod to sev ischem. EF 65%. Multivessel  CAD. Patent stent to the LAD and circum. Obtuse marginal has  40% in-stent stenosis.    CARDIAC CATHETERIZATION  10/14/2011    PTCA of circumflex. Stent could not be placed.    CARDIAC CATHETERIZATION  10/26/2010    [facility]. Xience stent to Obtuse Marginal    CARDIAC CATHETERIZATION  09/01/2005    Stent of obtuse marginal branch and right coronary artery. EF  60%    CORONARY ARTERY BYPASS GRAFT  05/17/2022    [facility] Dr [facility]. EF 55%. LV Hypertrophy, Abnormal Relaxation  Pattern.    ENDOVENOUS ABLATION SAPHENOUS VEIN W/ LASER  02/11/2016    Left and Right greater saphenous vein RF ablation was  completed without difficulty..    VEIN SURGERY         Current Outpatient Medications:     aspirin 81 MG EC tablet, Take 1 tablet by mouth Daily., Disp: , Rfl:     carvedilol (COREG) 6.25 MG tablet, Take 1 tablet by mouth 2 (Two) Times a Day., Disp: , Rfl:     clopidogrel (PLAVIX) 75 MG tablet, Take 1 tablet by mouth Daily., Disp: , Rfl:     ferrous sulfate 324 (65 Fe) MG tablet  "delayed-release EC tablet, Take 1 tablet by mouth Daily With Breakfast. (Patient taking differently: Take 1 tablet by mouth 2 (Two) Times a Day With Meals.), Disp: , Rfl:     glipizide (GLUCOTROL) 5 MG tablet, Take 1 tablet by mouth 2 (Two) Times a Day., Disp: , Rfl:     metFORMIN (GLUCOPHAGE) 500 MG tablet, Take 3 tablets by mouth See Admin Instructions. Take 1 tablet by mouth in the morning and 2 at bedtime. (Patient taking differently: Take 1 tablet by mouth 2 (Two) Times a Day With Meals. Take 1 tablet by mouth in the morning and 2 at bedtime.), Disp: , Rfl:     multivitamin with minerals tablet tablet, Take 1 tablet by mouth Daily., Disp: , Rfl:     pantoprazole (PROTONIX) 40 MG EC tablet, 1 tablet Daily., Disp: , Rfl:     rosuvastatin (CRESTOR) 20 MG tablet, Take 20 mg by mouth Daily. (Patient taking differently: Take 0.5 tablets by mouth Daily.), Disp: , Rfl:     traMADol (ULTRAM) 50 MG tablet, Take 1 tablet by mouth 3 (Three) Times a Day As Needed., Disp: , Rfl:     Turmeric (QC TUMERIC COMPLEX PO), Take 450 mg by mouth Daily., Disp: , Rfl:     vitamin D3 125 MCG (5000 UT) capsule capsule, Take 1 capsule by mouth Daily., Disp: , Rfl:     Objective     Vital Signs:  /77 (BP Location: Right arm, Patient Position: Sitting)   Pulse 68   Ht 180.3 cm (71\")   Wt 98.4 kg (216 lb 14.4 oz)   BMI 30.25 kg/m²   Estimated body mass index is 30.25 kg/m² as calculated from the following:    Height as of this encounter: 180.3 cm (71\").    Weight as of this encounter: 98.4 kg (216 lb 14.4 oz).             Cardiovascular:      PMI at left midclavicular line. Normal rate. Regular rhythm. Normal S1. Normal S2.       Murmurs: There is no murmur.      No gallop.  No click. No rub.   Pulses:     Intact distal pulses.   Edema:     Peripheral edema absent.              Data Review:   No results found for: \"GLUCOSE\", \"BUN\", \"CREATININE\", \"EGFRIFNONA\", \"EGFRIFAFRI\", \"BCR\", \"K\", \"CO2\", \"CALCIUM\", \"ALBUMIN\", \"BILIRUBIN\", " "\"AST\", \"ALT\"  No results found for: \"CHOL\", \"CHLPL\", \"TRIG\", \"HDL\", \"LDL\", \"LDLDIRECT\"   No results found for: \"WBC\", \"RBC\", \"HGB\", \"HCT\", \"MCV\", \"PLT\"  No results found for: \"TSH\"  No results found for: \"HGBA1C\"  No results found for: \"INR\", \"PROTIME\"        Assessment and Plan     Assessment & Plan  Coronary artery disease involving native coronary artery of native heart with refractory angina pectoris  Coronary Artery Disease (OPTIONAL): Coronary artery disease is worse . Patient was explained tests. Patient has class 3 angina.   Discussed with patient in detail about the findings of nuclear scan, the patient was explained about the test, she understand that nuclear medicine is injected that tags the red blood cells, the nuclear camera then takes the images, understanding that whenever the blood goes the nuclear medicine will go and would brighten up the image of the heart, the area that is not picked up by the nuclear medicine suggest either fixed or reversible defect explaining how that works.  The patient also understand GXT versus adenosine injection which is Lexiscan.  The patient fully understand that this is a screening to and not a diagnostic tool.  The accuracy of this test is 85% depending upon the images.  If patient's symptoms persist she will need a diagnostic test which is cardiac catheterization.  This test does not rule out coronary artery disease.  The patient needs to continue risk factor modification.  Discussed with patient options in detail.  The patient will see us sooner if the symptoms persist.   Continue current treatment regimen. Dietary sodium restriction. Weight loss. Regular aerobic exercise.  Cardiac status will be reassessed in 3 months.  Discuss with patient about the option of Left heart cath.  I have discussed with the patient in detail about his cardiac authorization and angioplasty risk, I have explained to him there is risk that includes intubation, dye allergy, dye damage to " the kidneys, bleeding, damage to artery, vein and nerve, risk of heart attack, emergency bypass surgery, tamponade, loss of stent, dissection and death.     Orders:    Obtain Informed Consent; Standing    Clip Bilateral Groins; Standing    Obtain Informed Consent in Pre-Op; Standing    Provide Patient With Hydration Protocol Handout; Standing    CBC & Differential; Standing    Basic Metabolic Panel; Standing    ECG 12 Lead Pre-Op / Pre-Procedure; Standing    NPO After Midnight    sodium chloride 0.9 % bolus 295.2 mL    Benign essential hypertension  Hypertension is stable and controlled  Continue current treatment regimen.  Dietary sodium restriction.  Weight loss.  Blood pressure will be reassessed in 3 months.  Continue carvedilol 6.25 mg twice a day  Orders:    Obtain Informed Consent; Standing    Clip Bilateral Groins; Standing    Obtain Informed Consent in Pre-Op; Standing    Provide Patient With Hydration Protocol Handout; Standing    CBC & Differential; Standing    Basic Metabolic Panel; Standing    ECG 12 Lead Pre-Op / Pre-Procedure; Standing    NPO After Midnight    sodium chloride 0.9 % bolus 295.2 mL    Hyperlipidemia LDL goal <70   Lipid abnormalities are stable    Plan:  Continue same medication/s without change.      Discussed medication dosage, use, side effects, and goals of treatment in detail.    Counseled patient on lifestyle modifications to help control hyperlipidemia.   Advised patient to exercise for 150 minutes weekly. (30 minute brisk walk, 5 days a week for example)    Patient Treatment Goals:   LDL goal is less than 55    Followup in 3 months.  Continue rosuvastatin 20 mg once a day  Orders:    Obtain Informed Consent; Standing    Clip Bilateral Groins; Standing    Obtain Informed Consent in Pre-Op; Standing    Provide Patient With Hydration Protocol Handout; Standing    CBC & Differential; Standing    Basic Metabolic Panel; Standing    ECG 12 Lead Pre-Op / Pre-Procedure; Standing    NPO  After Midnight    sodium chloride 0.9 % bolus 295.2 mL    Infrarenal abdominal aortic aneurysm (AAA) without rupture  The abdominal arterial duplex did not reveal AAA at this time.  May need to do CT scan if needed.  Orders:    Obtain Informed Consent; Standing    Clip Bilateral Groins; Standing    Obtain Informed Consent in Pre-Op; Standing    Provide Patient With Hydration Protocol Handout; Standing    CBC & Differential; Standing    Basic Metabolic Panel; Standing    ECG 12 Lead Pre-Op / Pre-Procedure; Standing    NPO After Midnight    sodium chloride 0.9 % bolus 295.2 mL    Peripheral artery disease  The patient has severe peripheral arterial disease of the lower extremities bilaterally below the knee the arteries are calcified consistent with diabetic vessels.  Aggressive diabetes management including going on semaglutide like Ozempic.  Long discussion with the patient, he has tried Ozempic in the past that is too expensive, also tried Farxiga is too expensive and Xarelto 2.5 mg twice a day to lower the risk of limb loss is also too expensive.  The patient fully understand the benefit of these medications that we have discussed in detail.  Orders:    Obtain Informed Consent; Standing    Clip Bilateral Groins; Standing    Obtain Informed Consent in Pre-Op; Standing    Provide Patient With Hydration Protocol Handout; Standing    CBC & Differential; Standing    Basic Metabolic Panel; Standing    ECG 12 Lead Pre-Op / Pre-Procedure; Standing    NPO After Midnight    sodium chloride 0.9 % bolus 295.2 mL      Advised to continue current cardiac medications. Please notify of any issues. Discussed with the patient compliance with medical management and follow-up.     Follow Up  Return in about 3 months (around 8/23/2025).    Call if you have any significant symptoms or go to the [facility] if possible.     Charanjit Galo MD, FACC,[facility].  [facility]    Part of this note  may be an electronic transcription/translation of spoken language to printed text using the Dragon Dictation System.

## 2025-05-22 NOTE — ASSESSMENT & PLAN NOTE
Lipid abnormalities are stable    Plan:  Continue same medication/s without change.      Discussed medication dosage, use, side effects, and goals of treatment in detail.    Counseled patient on lifestyle modifications to help control hyperlipidemia.   Advised patient to exercise for 150 minutes weekly. (30 minute brisk walk, 5 days a week for example)    Patient Treatment Goals:   LDL goal is less than 55    Followup in 3 months.  Continue rosuvastatin 20 mg once a day  Orders:    Obtain Informed Consent; Standing    Clip Bilateral Groins; Standing    Obtain Informed Consent in Pre-Op; Standing    Provide Patient With Hydration Protocol Handout; Standing    CBC & Differential; Standing    Basic Metabolic Panel; Standing    ECG 12 Lead Pre-Op / Pre-Procedure; Standing    NPO After Midnight    sodium chloride 0.9 % bolus 295.2 mL

## 2025-05-22 NOTE — ASSESSMENT & PLAN NOTE
The patient has severe peripheral arterial disease of the lower extremities bilaterally below the knee the arteries are calcified consistent with diabetic vessels.  Aggressive diabetes management including going on semaglutide like Ozempic.  Long discussion with the patient, he has tried Ozempic in the past that is too expensive, also tried Farxiga is too expensive and Xarelto 2.5 mg twice a day to lower the risk of limb loss is also too expensive.  The patient fully understand the benefit of these medications that we have discussed in detail.  Orders:    Obtain Informed Consent; Standing    Clip Bilateral Groins; Standing    Obtain Informed Consent in Pre-Op; Standing    Provide Patient With Hydration Protocol Handout; Standing    CBC & Differential; Standing    Basic Metabolic Panel; Standing    ECG 12 Lead Pre-Op / Pre-Procedure; Standing    NPO After Midnight    sodium chloride 0.9 % bolus 295.2 mL

## 2025-05-22 NOTE — ASSESSMENT & PLAN NOTE
Hypertension is stable and controlled  Continue current treatment regimen.  Dietary sodium restriction.  Weight loss.  Blood pressure will be reassessed in 3 months.  Continue carvedilol 6.25 mg twice a day  Orders:    Obtain Informed Consent; Standing    Clip Bilateral Groins; Standing    Obtain Informed Consent in Pre-Op; Standing    Provide Patient With Hydration Protocol Handout; Standing    CBC & Differential; Standing    Basic Metabolic Panel; Standing    ECG 12 Lead Pre-Op / Pre-Procedure; Standing    NPO After Midnight    sodium chloride 0.9 % bolus 295.2 mL

## 2025-05-22 NOTE — ASSESSMENT & PLAN NOTE
Coronary Artery Disease (OPTIONAL): Coronary artery disease is worse . Patient was explained tests. Patient has class 3 angina.   Discussed with patient in detail about the findings of nuclear scan, the patient was explained about the test, she understand that nuclear medicine is injected that tags the red blood cells, the nuclear camera then takes the images, understanding that whenever the blood goes the nuclear medicine will go and would brighten up the image of the heart, the area that is not picked up by the nuclear medicine suggest either fixed or reversible defect explaining how that works.  The patient also understand GXT versus adenosine injection which is Lexiscan.  The patient fully understand that this is a screening to and not a diagnostic tool.  The accuracy of this test is 85% depending upon the images.  If patient's symptoms persist she will need a diagnostic test which is cardiac catheterization.  This test does not rule out coronary artery disease.  The patient needs to continue risk factor modification.  Discussed with patient options in detail.  The patient will see us sooner if the symptoms persist.   Continue current treatment regimen. Dietary sodium restriction. Weight loss. Regular aerobic exercise.  Cardiac status will be reassessed in 3 months.  Discuss with patient about the option of Left heart cath.  I have discussed with the patient in detail about his cardiac authorization and angioplasty risk, I have explained to him there is risk that includes intubation, dye allergy, dye damage to the kidneys, bleeding, damage to artery, vein and nerve, risk of heart attack, emergency bypass surgery, tamponade, loss of stent, dissection and death.     Orders:    Obtain Informed Consent; Standing    Clip Bilateral Groins; Standing    Obtain Informed Consent in Pre-Op; Standing    Provide Patient With Hydration Protocol Handout; Standing    CBC & Differential; Standing    Basic Metabolic Panel;  Standing    ECG 12 Lead Pre-Op / Pre-Procedure; Standing    NPO After Midnight    sodium chloride 0.9 % bolus 295.2 mL

## 2025-05-23 ENCOUNTER — OFFICE VISIT (OUTPATIENT)
Age: 75
End: 2025-05-23
Payer: MEDICARE

## 2025-05-23 ENCOUNTER — PATIENT ROUNDING (BHMG ONLY) (OUTPATIENT)
Age: 75
End: 2025-05-23

## 2025-05-23 VITALS
WEIGHT: 216.9 LBS | HEIGHT: 71 IN | SYSTOLIC BLOOD PRESSURE: 152 MMHG | DIASTOLIC BLOOD PRESSURE: 77 MMHG | HEART RATE: 68 BPM | BODY MASS INDEX: 30.36 KG/M2

## 2025-05-23 DIAGNOSIS — I25.112 CORONARY ARTERY DISEASE INVOLVING NATIVE CORONARY ARTERY OF NATIVE HEART WITH REFRACTORY ANGINA PECTORIS: Primary | ICD-10-CM

## 2025-05-23 DIAGNOSIS — I73.9 PERIPHERAL ARTERY DISEASE: ICD-10-CM

## 2025-05-23 DIAGNOSIS — I10 BENIGN ESSENTIAL HYPERTENSION: ICD-10-CM

## 2025-05-23 DIAGNOSIS — I71.43 INFRARENAL ABDOMINAL AORTIC ANEURYSM (AAA) WITHOUT RUPTURE: ICD-10-CM

## 2025-05-23 DIAGNOSIS — E78.5 HYPERLIPIDEMIA LDL GOAL <70: ICD-10-CM

## 2025-05-23 RX ORDER — EZETIMIBE 10 MG/1
10 TABLET ORAL DAILY
Qty: 90 TABLET | Refills: 3 | Status: SHIPPED | OUTPATIENT
Start: 2025-05-23

## 2025-05-23 NOTE — PROGRESS NOTES
My name is Mel Jones, and I am the Practice Manager for [facility].    I would like to thank you for being a loyal patient. If you do not mind, I would like to ask you some questions about your recent visit with us. Please feel free to reply if you wish to provide us with feedback on your visit with our practice.    First, could you tell me what went well with your recent visit?    Secondly, we are always looking for ways to make our patients' experiences even better. Do you have any recommendations on what we can do to improve your experience?    Finally, overall were you satisfied with your visit with us as a [facility] facility?    Over the next few days, you will be receiving a Patient Experience Survey. Please consider taking the survey, as it helps [facility] in improving their patient care.    Thank you for taking the time to answer our questions today.    I hope you have a good day.

## 2025-05-28 ENCOUNTER — HOSPITAL ENCOUNTER (OUTPATIENT)
Facility: HOSPITAL | Age: 75
Setting detail: HOSPITAL OUTPATIENT SURGERY
Discharge: HOME | End: 2025-05-28
Attending: INTERNAL MEDICINE | Admitting: INTERNAL MEDICINE
Payer: MEDICARE

## 2025-05-28 VITALS
HEART RATE: 73 BPM | RESPIRATION RATE: 12 BRPM | WEIGHT: 215.8 LBS | HEIGHT: 71 IN | OXYGEN SATURATION: 96 % | DIASTOLIC BLOOD PRESSURE: 81 MMHG | BODY MASS INDEX: 30.21 KG/M2 | SYSTOLIC BLOOD PRESSURE: 180 MMHG | TEMPERATURE: 97.2 F

## 2025-05-28 DIAGNOSIS — J30.2 SEASONAL AND PERENNIAL ALLERGIC RHINITIS: ICD-10-CM

## 2025-05-28 DIAGNOSIS — Z87.891 FORMER SMOKER: ICD-10-CM

## 2025-05-28 DIAGNOSIS — I73.9 PERIPHERAL ARTERY DISEASE: ICD-10-CM

## 2025-05-28 DIAGNOSIS — I73.9 LEFT LEG CLAUDICATION: ICD-10-CM

## 2025-05-28 DIAGNOSIS — G47.33 OSA (OBSTRUCTIVE SLEEP APNEA): ICD-10-CM

## 2025-05-28 DIAGNOSIS — J30.89 SEASONAL AND PERENNIAL ALLERGIC RHINITIS: ICD-10-CM

## 2025-05-28 DIAGNOSIS — E78.5 HYPERLIPIDEMIA LDL GOAL <70: ICD-10-CM

## 2025-05-28 DIAGNOSIS — K21.9 CHRONIC GERD: ICD-10-CM

## 2025-05-28 DIAGNOSIS — I71.43 INFRARENAL ABDOMINAL AORTIC ANEURYSM (AAA) WITHOUT RUPTURE: ICD-10-CM

## 2025-05-28 DIAGNOSIS — I10 BENIGN ESSENTIAL HYPERTENSION: ICD-10-CM

## 2025-05-28 DIAGNOSIS — Z86.79 HISTORY OF CORONARY ARTERY DISEASE: ICD-10-CM

## 2025-05-28 DIAGNOSIS — I25.112 CORONARY ARTERY DISEASE INVOLVING NATIVE CORONARY ARTERY OF NATIVE HEART WITH REFRACTORY ANGINA PECTORIS: ICD-10-CM

## 2025-05-28 DIAGNOSIS — I20.9 ANGINA, CLASS IV: Primary | ICD-10-CM

## 2025-05-28 DIAGNOSIS — R06.09 DYSPNEA ON EXERTION: ICD-10-CM

## 2025-05-28 LAB
ANION GAP SERPL CALCULATED.3IONS-SCNC: 15 MMOL/L (ref 5–15)
BASOPHILS # BLD AUTO: 0.09 10*3/MM3 (ref 0–0.2)
BASOPHILS NFR BLD AUTO: 1 % (ref 0–1.5)
BUN SERPL-MCNC: 18.1 MG/DL (ref 8–23)
BUN/CREAT SERPL: 12.9 (ref 7–25)
CALCIUM SPEC-SCNC: 9.6 MG/DL (ref 8.6–10.5)
CHLORIDE SERPL-SCNC: 101 MMOL/L (ref 98–107)
CO2 SERPL-SCNC: 25 MMOL/L (ref 22–29)
CREAT SERPL-MCNC: 1.4 MG/DL (ref 0.76–1.27)
DEPRECATED RDW RBC AUTO: 40.6 FL (ref 37–54)
EGFRCR SERPLBLD CKD-EPI 2021: 52.4 ML/MIN/1.73
EOSINOPHIL # BLD AUTO: 0.65 10*3/MM3 (ref 0–0.4)
EOSINOPHIL NFR BLD AUTO: 7.3 % (ref 0.3–6.2)
ERYTHROCYTE [DISTWIDTH] IN BLOOD BY AUTOMATED COUNT: 12.8 % (ref 12.3–15.4)
GLUCOSE SERPL-MCNC: 91 MG/DL (ref 65–99)
HCT VFR BLD AUTO: 43.4 % (ref 37.5–51)
HGB BLD-MCNC: 14.8 G/DL (ref 13–17.7)
IMM GRANULOCYTES # BLD AUTO: 0.06 10*3/MM3 (ref 0–0.05)
IMM GRANULOCYTES NFR BLD AUTO: 0.7 % (ref 0–0.5)
LYMPHOCYTES # BLD AUTO: 2.72 10*3/MM3 (ref 0.7–3.1)
LYMPHOCYTES NFR BLD AUTO: 30.6 % (ref 19.6–45.3)
MCH RBC QN AUTO: 29.9 PG (ref 26.6–33)
MCHC RBC AUTO-ENTMCNC: 34.1 G/DL (ref 31.5–35.7)
MCV RBC AUTO: 87.7 FL (ref 79–97)
MONOCYTES # BLD AUTO: 0.8 10*3/MM3 (ref 0.1–0.9)
MONOCYTES NFR BLD AUTO: 9 % (ref 5–12)
NEUTROPHILS NFR BLD AUTO: 4.57 10*3/MM3 (ref 1.7–7)
NEUTROPHILS NFR BLD AUTO: 51.4 % (ref 42.7–76)
NRBC BLD AUTO-RTO: 0 /100 WBC (ref 0–0.2)
PLATELET # BLD AUTO: 245 10*3/MM3 (ref 140–450)
PMV BLD AUTO: 9.6 FL (ref 6–12)
POTASSIUM SERPL-SCNC: 4.4 MMOL/L (ref 3.5–5.2)
RBC # BLD AUTO: 4.95 10*6/MM3 (ref 4.14–5.8)
SODIUM SERPL-SCNC: 141 MMOL/L (ref 136–145)
WBC NRBC COR # BLD AUTO: 8.89 10*3/MM3 (ref 3.4–10.8)

## 2025-05-28 PROCEDURE — C1874 STENT, COATED/COV W/DEL SYS: HCPCS | Performed by: INTERNAL MEDICINE

## 2025-05-28 PROCEDURE — 85025 COMPLETE CBC W/AUTO DIFF WBC: CPT | Performed by: INTERNAL MEDICINE

## 2025-05-28 PROCEDURE — 25510000001 IOPAMIDOL PER 1 ML: Performed by: INTERNAL MEDICINE

## 2025-05-28 PROCEDURE — C1887 CATHETER, GUIDING: HCPCS | Performed by: INTERNAL MEDICINE

## 2025-05-28 PROCEDURE — C1725 CATH, TRANSLUMIN NON-LASER: HCPCS | Performed by: INTERNAL MEDICINE

## 2025-05-28 PROCEDURE — 25010000002 LIDOCAINE PF 1% 1 % SOLUTION: Performed by: INTERNAL MEDICINE

## 2025-05-28 PROCEDURE — 25010000002 BIVALIRUDIN TRIFLUOROACETATE 250 MG RECONSTITUTED SOLUTION 1 EACH VIAL: Performed by: INTERNAL MEDICINE

## 2025-05-28 PROCEDURE — C1769 GUIDE WIRE: HCPCS | Performed by: INTERNAL MEDICINE

## 2025-05-28 PROCEDURE — 25010000002 NICARDIPINE 2.5 MG/ML SOLUTION: Performed by: INTERNAL MEDICINE

## 2025-05-28 PROCEDURE — 25010000002 MIDAZOLAM PER 1 MG: Performed by: INTERNAL MEDICINE

## 2025-05-28 PROCEDURE — 93459 L HRT ART/GRFT ANGIO: CPT | Performed by: INTERNAL MEDICINE

## 2025-05-28 PROCEDURE — C1894 INTRO/SHEATH, NON-LASER: HCPCS | Performed by: INTERNAL MEDICINE

## 2025-05-28 PROCEDURE — 25010000002 FENTANYL CITRATE (PF) 50 MCG/ML SOLUTION: Performed by: INTERNAL MEDICINE

## 2025-05-28 PROCEDURE — 25810000003 SODIUM CHLORIDE 0.9 % SOLUTION: Performed by: INTERNAL MEDICINE

## 2025-05-28 PROCEDURE — 80048 BASIC METABOLIC PNL TOTAL CA: CPT | Performed by: INTERNAL MEDICINE

## 2025-05-28 PROCEDURE — 25010000002 HEPARIN (PORCINE) PER 1000 UNITS: Performed by: INTERNAL MEDICINE

## 2025-05-28 RX ORDER — CLOPIDOGREL BISULFATE 75 MG/1
TABLET ORAL
Status: DISCONTINUED | OUTPATIENT
Start: 2025-05-28 | End: 2025-05-28 | Stop reason: HOSPADM

## 2025-05-28 RX ORDER — MIDAZOLAM HYDROCHLORIDE 1 MG/ML
INJECTION, SOLUTION INTRAMUSCULAR; INTRAVENOUS
Status: DISCONTINUED | OUTPATIENT
Start: 2025-05-28 | End: 2025-05-28 | Stop reason: HOSPADM

## 2025-05-28 RX ORDER — LIDOCAINE HYDROCHLORIDE 10 MG/ML
INJECTION, SOLUTION EPIDURAL; INFILTRATION; INTRACAUDAL; PERINEURAL
Status: DISCONTINUED | OUTPATIENT
Start: 2025-05-28 | End: 2025-05-28 | Stop reason: HOSPADM

## 2025-05-28 RX ORDER — IOPAMIDOL 755 MG/ML
INJECTION, SOLUTION INTRAVASCULAR
Status: DISCONTINUED | OUTPATIENT
Start: 2025-05-28 | End: 2025-05-28 | Stop reason: HOSPADM

## 2025-05-28 RX ORDER — SODIUM CHLORIDE 0.9 % (FLUSH) 0.9 %
10 SYRINGE (ML) INJECTION AS NEEDED
Status: DISCONTINUED | OUTPATIENT
Start: 2025-05-28 | End: 2025-05-28 | Stop reason: HOSPADM

## 2025-05-28 RX ORDER — HEPARIN SODIUM 1000 [USP'U]/ML
INJECTION, SOLUTION INTRAVENOUS; SUBCUTANEOUS
Status: DISCONTINUED | OUTPATIENT
Start: 2025-05-28 | End: 2025-05-28 | Stop reason: HOSPADM

## 2025-05-28 RX ORDER — SODIUM CHLORIDE 9 MG/ML
150 INJECTION, SOLUTION INTRAVENOUS CONTINUOUS
Status: ACTIVE | OUTPATIENT
Start: 2025-05-28 | End: 2025-05-28

## 2025-05-28 RX ORDER — FENTANYL CITRATE 50 UG/ML
INJECTION, SOLUTION INTRAMUSCULAR; INTRAVENOUS
Status: DISCONTINUED | OUTPATIENT
Start: 2025-05-28 | End: 2025-05-28 | Stop reason: HOSPADM

## 2025-05-28 RX ORDER — SODIUM CHLORIDE 9 MG/ML
40 INJECTION, SOLUTION INTRAVENOUS AS NEEDED
Status: DISCONTINUED | OUTPATIENT
Start: 2025-05-28 | End: 2025-05-28 | Stop reason: HOSPADM

## 2025-05-28 RX ORDER — ROSUVASTATIN CALCIUM 40 MG/1
40 TABLET, COATED ORAL DAILY
Qty: 90 TABLET | Refills: 1 | Status: SHIPPED | OUTPATIENT
Start: 2025-05-28

## 2025-05-28 RX ORDER — SODIUM CHLORIDE 0.9 % (FLUSH) 0.9 %
10 SYRINGE (ML) INJECTION EVERY 12 HOURS SCHEDULED
Status: DISCONTINUED | OUTPATIENT
Start: 2025-05-28 | End: 2025-05-28 | Stop reason: HOSPADM

## 2025-05-28 RX ORDER — ACETAMINOPHEN 325 MG/1
650 TABLET ORAL EVERY 4 HOURS PRN
Status: DISCONTINUED | OUTPATIENT
Start: 2025-05-28 | End: 2025-05-28 | Stop reason: HOSPADM

## 2025-05-28 RX ADMIN — SODIUM CHLORIDE 295.2 ML: 9 INJECTION, SOLUTION INTRAVENOUS at 14:02

## 2025-05-28 NOTE — Clinical Note
First balloon inflation max pressure = 12 gracy. First balloon inflation duration = 14 seconds. Second inflation of balloon - Max pressure = 15 gracy. 2nd Inflation of balloon - Duration = 15 seconds. Third inflation of balloon - Max pressure = 13 gracy. 3rd Inflation of balloon - Duration = 10 seconds.

## 2025-05-28 NOTE — Clinical Note
First balloon inflation max pressure = 12 gracy. First balloon inflation duration = 8 seconds. Second inflation of balloon - Max pressure = 16 gracy. 2nd Inflation of balloon - Duration = 20 seconds. Third inflation of balloon - Max pressure = 14 gracy. 3rd Inflation of balloon - Duration = 5 seconds.

## 2025-05-29 ENCOUNTER — DOCUMENTATION (OUTPATIENT)
Dept: CARDIAC REHAB | Facility: HOSPITAL | Age: 75
End: 2025-05-29
Payer: MEDICARE

## 2025-05-29 ENCOUNTER — CALL CENTER PROGRAMS (OUTPATIENT)
Dept: CALL CENTER | Facility: HOSPITAL | Age: 75
End: 2025-05-29
Payer: MEDICARE

## 2025-05-29 NOTE — OUTREACH NOTE
PCI/Device Survey      Flowsheet Row Responses   Facility patient discharged from? [facility]   Procedure date 05/28/25   Procedure (if device, specify in description) PCI   PCI site Right, Arm   Performing MD Other (annotate)  [Dr. Charanjit Galo]   Attempt successful? Yes   Call start time 0919   Call end time 0923   Has the patient had any of the following symptoms since discharge? --  [No symptoms noted]   Is the patient taking prescribed medications: Plavix, ASA   Nursing intervention Reminded to continue to take prescribed medications, Nurse provided patient education   Does the patient have any of the following symptoms related to the cath/surgical site? --  [No symptoms noted]   Nursing intervention Patient education provided   Does the patient have an appointment scheduled with the cardiologist? Yes   Appointment comments Follow up with cardiology on 6/9   If the patient is a current smoker, are they able to teach back resources for cessation? Not a smoker   Did the patient feel prepared to go home on the same day as the procedure? Yes   Is the patient satisfied with the same day discharge process? Yes   PCI/Device call completed Yes   Wrap up additional comments Doing well, denies any questions or concerns.            Lorena LEWIS - Registered Nurse

## 2025-06-07 NOTE — ASSESSMENT & PLAN NOTE
Hypertension is stable and controlled  Continue current treatment regimen.  Weight loss.  Blood pressure will be reassessed in 3 months.  Continue carvedilol 6.25 mg twice a day.

## 2025-06-07 NOTE — ASSESSMENT & PLAN NOTE
Lipid abnormalities are stable    Plan:  Continue same medication/s without change.      Discussed medication dosage, use, side effects, and goals of treatment in detail.    Counseled patient on lifestyle modifications to help control hyperlipidemia.   Advised patient to exercise for 150 minutes weekly. (30 minute brisk walk, 5 days a week for example)    Patient Treatment Goals:   LDL goal is less than 55    Followup in 3 months.  Continue rosuvastatin 40 mg once a day

## 2025-06-07 NOTE — ASSESSMENT & PLAN NOTE
Continue exercise daily walks, weight reduction, and keeping the A1c less than 7.0.  The patient is on aspirin and Plavix and after a year we can start him on Xarelto.

## 2025-06-07 NOTE — PROGRESS NOTES
Cardiology Follow-Up Note     Name: Carlos W Rian  :   1950  PCP: Provider, No Known  Date:   2025  Department: [facility] [place] [facility] CARDIOLOGY  [address]  [facility] [address]  Fax [phone]  Phone [phone]    Chief Complaint   Patient presents with    Hypertension    Hyperlipidemia     Problem list:  CAD    Heart cath #7/10/22: Multivessel CAD, history of multiple stents, preserved LV function  CABG 2022 Lexiscan revealing EF 60% small to moderate infarct located in the basal inferior lateral wall of the left ventricle with mild to moderate nilton-infarct ischemia.  2025 heart catheterization multivessel coronary artery disease 2 out of 3 bypass graft patent LIMA to LAD patent with distal disease severe.  Successful PTCA of the circumflex coronary artery small vessel disease.Cardiac Catheterization/Vascular Study (2025 17:32)         HTN  AAA  Abdominal ultrasound 2024: Infrarenal AAA 3.68 cm  2025 when the AAA was not visible.    Bilateral iliac stenosis 50 to 75%.  CKD 2  2024: GFR 67  Renal Ultrasound 2017: No stenosis  Dilated aortic root  ECHO 2024: EF 52%, moderate tricuspid regurg, moderate pulmonary hypertension, aortic root mildly dilated 3.8 cm  HLD  2024: LDL 53  Carotid Ultrasound 2023: Less than 50% stenosis bilaterally  Diabetes type 2  2024: A1c 7.5  8.  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    9.  Peripheral arterial disease   5/15/2025 arterial duplex semination reveal severe calcification of the arteries below the knee with greater than 50% stenosis of the tibioperoneal trunk and greater than 70% stenosis of the right anterior tibial artery and less than 50% stenosis of the left anterior tibial artery, posterior tibial artery, peroneal artery.    Subjective     History of Present Illness  Carlos Modi  is a 75 y.o. male who presents today postcardiac catheterization and angioplasty. Doing ok, no chest pain or shortness of breath.         Past Medical History:   Diagnosis Date    Aortic root dilation     Arthritis     Benign essential hypertension     CAD in native artery     Claudication     Diabetes mellitus, type II     DM (diabetes mellitus)     Erectile dysfunction     Gout     Heart disease     Hypercholesterolemia     Left ventricular hypertrophy     Mitral regurgitation     Myocardial infarction     SAROJ (obstructive sleep apnea)     Seizures     Sleep apnea     Tricuspid regurgitation       Past Surgical History:   Procedure Laterality Date    BACK SURGERY      [place]ARDIAC [place]HETER[place]  [place]    [facility] [place]: CAD, HX Stenting, Angina. EF 75%. Multivessel  CAD, Preserved LVF.    CARDIAC CATHE[place]  [place]10[place]    [facility] [place] known CAD, angina, abn stress study/EF 55%.  CAD with patent stent to LAD and CX. Chronically occluded RCA.    CARDIAC CATHETER[place]  [place]30/[place]    [facility] [place] severe SoA, abn PET scan/Multivessel CAD.  Preserved LVF.    CARDIAC CATHETERIZATION  06/27/2012    IOP: SOB/mod size mod to sev ischem. EF 65%. Multivessel  CAD. Patent stent to the LAD and circum. Obtuse marginal has  40% in-stent stenosis.    CARDIAC CATHETERIZATION  10/14/2011    PTCA of circumflex. Stent could not be placed.    CARDIAC CATHETERIZATION  10/26/2010    MQ. Xience stent to Obtuse Marginal    CARDIAC CATHETERIZATION  09/01/2005    Stent of obtuse marginal branch and right coronary artery. EF  60%    CARDIAC CATHETERIZATION N/A 5/28/2025    Procedure: Left Heart Cath - Left radial access;  Surgeon: Charanjit Galo MD;  Location:  [facility] CATH INVASIVE LOCATION;  Service: Cardiovascular;  Laterality: N/A;    CARDIAC CATHETERIZATION N/A 5/28/2025    Procedure: Stent TAMEKA coronary;  Surgeon: Charanjit Galo MD;  Location: [facility] CATH INVASIVE LOCATION;  Service: Cardiovascular;  Laterality: N/A;    CORONARY  ARTERY BYPASS GRAFT  05/17/2022    [facility] Dr [facility]. EF 55%. LV Hypertrophy, Abnormal Relaxation  Pattern.    ENDOVENOUS ABLATION SAPHENOUS VEIN W/ LASER  02/11/2016    Left and Right greater saphenous vein RF ablation was  completed without difficulty..    VEIN SURGERY         Current Outpatient Medications:     aspirin 81 MG EC tablet, Take 1 tablet by mouth Daily., Disp: , Rfl:     carvedilol (COREG) 6.25 MG tablet, Take 1 tablet by mouth 2 (Two) Times a Day., Disp: , Rfl:     clopidogrel (PLAVIX) 75 MG tablet, Take 1 tablet by mouth Daily., Disp: , Rfl:     dapagliflozin Propanediol (Farxiga) 10 MG tablet, Take 10 mg by mouth Daily. Done after this week. Was taking samples only. Could not afford., Disp: , Rfl:     ezetimibe (ZETIA) 10 MG tablet, Take 1 tablet by mouth Daily., Disp: 90 tablet, Rfl: 3    ferrous sulfate 324 (65 Fe) MG tablet delayed-release EC tablet, Take 1 tablet by mouth Daily With Breakfast., Disp: , Rfl:     glipizide (GLUCOTROL) 5 MG tablet, Take 1 tablet by mouth 2 (Two) Times a Day., Disp: , Rfl:     metFORMIN (GLUCOPHAGE) 500 MG tablet, Take 3 tablets by mouth See Admin Instructions. Take 1 tablet by mouth in the morning and 2 at bedtime. (Patient taking differently: Take 1 tablet by mouth 2 (Two) Times a Day With Meals. Take 1 tablet by mouth in the morning and 2 at bedtime.), Disp: , Rfl:     multivitamin with minerals tablet tablet, Take 1 tablet by mouth Daily., Disp: , Rfl:     pantoprazole (PROTONIX) 40 MG EC tablet, Take 1 tablet by mouth Daily., Disp: , Rfl:     rosuvastatin (CRESTOR) 40 MG tablet, Take 1 tablet by mouth Daily., Disp: 90 tablet, Rfl: 1    traMADol (ULTRAM) 50 MG tablet, Take 1 tablet by mouth 3 (Three) Times a Day As Needed., Disp: , Rfl:     Turmeric (QC TUMERIC COMPLEX PO), Take 450 mg by mouth Daily., Disp: , Rfl:     vitamin D3 125 MCG (5000 UT) capsule capsule, Take 1 capsule by mouth Daily., Disp: , Rfl:     Objective     Vital Signs:  /64 (BP Location:  "Right arm, Patient Position: Sitting, Cuff Size: Adult)   Pulse 69   Ht 180.3 cm (71\")   Wt 96.6 kg (213 lb)   BMI 29.71 kg/m²   Estimated body mass index is 29.71 kg/m² as calculated from the following:    Height as of this encounter: 180.3 cm (71\").    Weight as of this encounter: 96.6 kg (213 lb).             Cardiovascular:      PMI at left midclavicular line. Normal rate. Regular rhythm. Normal S1. Normal S2.       Murmurs: There is no murmur.      No gallop.  No click. No rub.   Pulses:     Intact distal pulses.   Edema:     Peripheral edema absent.              Data Review:   Lab Results   Component Value Date    GLUCOSE 91 05/28/2025    BUN 18.1 05/28/2025    CREATININE 1.40 (H) 05/28/2025    BCR 12.9 05/28/2025    K 4.4 05/28/2025    CO2 25.0 05/28/2025    CALCIUM 9.6 05/28/2025     No results found for: \"CHOL\", \"CHLPL\", \"TRIG\", \"HDL\", \"LDL\", \"LDLDIRECT\"   Lab Results   Component Value Date    WBC 8.89 05/28/2025    RBC 4.95 05/28/2025    HGB 14.8 05/28/2025    HCT 43.4 05/28/2025    MCV 87.7 05/28/2025     05/28/2025     No results found for: \"TSH\"  No results found for: \"HGBA1C\"  No results found for: \"INR\", \"PROTIME\"        Assessment and Plan     Assessment & Plan  Coronary artery disease involving native coronary artery of native heart with refractory angina pectoris  Coronary Artery Disease (OPTIONAL): Coronary artery disease is stable.  Continue current treatment regimen. Dietary sodium restriction.  Cardiac status will be reassessed in 2 weeks.  Continue dual antiplatelet therapy aspirin and Plavix as discussed for at least 6 months to a year.  Discussed with patient Heart catheter report.  It is not essential to keep the hemoglobin A1c less than 7.0.  The patient currently is on Farxiga 10 mg once a day.  The medicine is extremely expensive to afford.  He also needs to be on Ozempic or semaglutide medication however they are also very expensive.       Benign essential " hypertension  Hypertension is stable and controlled  Continue current treatment regimen.  Weight loss.  Blood pressure will be reassessed in 3 months.  Continue carvedilol 6.25 mg twice a day.       Hyperlipidemia LDL goal <70   Lipid abnormalities are stable    Plan:  Continue same medication/s without change.      Discussed medication dosage, use, side effects, and goals of treatment in detail.    Counseled patient on lifestyle modifications to help control hyperlipidemia.   Advised patient to exercise for 150 minutes weekly. (30 minute brisk walk, 5 days a week for example)    Patient Treatment Goals:   LDL goal is less than 55    Followup in 3 months.  Continue rosuvastatin 40 mg once a day       Infrarenal abdominal aortic aneurysm (AAA) without rupture  We will repeat abdominal aortic ultrasound.       Peripheral artery disease  Continue exercise daily walks, weight reduction, and keeping the A1c less than 7.0.  The patient is on aspirin and Plavix and after a year we can start him on Xarelto.         Advised to continue current cardiac medications. Please notify of any issues. Discussed with the patient compliance with medical management and follow-up.     Follow Up  Return in about 3 months (around 9/9/2025).    Call if you have any significant symptoms or go to the [facility] Emergency room if possible.     Charanjit Galo MD, FACC,[facility]  [facility]    Part of this note may be an electronic transcription/translation of spoken language to printed text using the Dragon Dictation System.

## 2025-06-07 NOTE — ASSESSMENT & PLAN NOTE
Coronary Artery Disease (OPTIONAL): Coronary artery disease is stable.  Continue current treatment regimen. Dietary sodium restriction.  Cardiac status will be reassessed in 2 weeks.  Continue dual antiplatelet therapy aspirin and Plavix as discussed for at least 6 months to a year.  Discussed with patient Heart catheter report.  It is not essential to keep the hemoglobin A1c less than 7.0.  The patient currently is on Farxiga 10 mg once a day.  The medicine is extremely expensive to afford.  He also needs to be on Ozempic or semaglutide medication however they are also very expensive.

## 2025-06-09 ENCOUNTER — OFFICE VISIT (OUTPATIENT)
Age: 75
End: 2025-06-09
Payer: MEDICARE

## 2025-06-09 ENCOUNTER — PATIENT ROUNDING (BHMG ONLY) (OUTPATIENT)
Age: 75
End: 2025-06-09

## 2025-06-09 VITALS
BODY MASS INDEX: 29.82 KG/M2 | DIASTOLIC BLOOD PRESSURE: 64 MMHG | SYSTOLIC BLOOD PRESSURE: 121 MMHG | HEART RATE: 69 BPM | WEIGHT: 213 LBS | HEIGHT: 71 IN

## 2025-06-09 DIAGNOSIS — I25.112 CORONARY ARTERY DISEASE INVOLVING NATIVE CORONARY ARTERY OF NATIVE HEART WITH REFRACTORY ANGINA PECTORIS: Primary | ICD-10-CM

## 2025-06-09 DIAGNOSIS — I73.9 PERIPHERAL ARTERY DISEASE: ICD-10-CM

## 2025-06-09 DIAGNOSIS — I71.43 INFRARENAL ABDOMINAL AORTIC ANEURYSM (AAA) WITHOUT RUPTURE: ICD-10-CM

## 2025-06-09 DIAGNOSIS — I10 BENIGN ESSENTIAL HYPERTENSION: ICD-10-CM

## 2025-06-09 DIAGNOSIS — E78.5 HYPERLIPIDEMIA LDL GOAL <70: ICD-10-CM

## 2025-06-09 PROCEDURE — 1160F RVW MEDS BY RX/DR IN RCRD: CPT | Performed by: INTERNAL MEDICINE

## 2025-06-09 PROCEDURE — 99214 OFFICE O/P EST MOD 30 MIN: CPT | Performed by: INTERNAL MEDICINE

## 2025-06-09 PROCEDURE — 3078F DIAST BP <80 MM HG: CPT | Performed by: INTERNAL MEDICINE

## 2025-06-09 PROCEDURE — 3074F SYST BP LT 130 MM HG: CPT | Performed by: INTERNAL MEDICINE

## 2025-06-09 PROCEDURE — 1159F MED LIST DOCD IN RCRD: CPT | Performed by: INTERNAL MEDICINE

## 2025-06-09 RX ORDER — DAPAGLIFLOZIN 10 MG/1
1 TABLET, FILM COATED ORAL DAILY
COMMUNITY

## 2025-06-09 NOTE — PROGRESS NOTES
My name is Mel Jones, and I am the Practice Manager for [facility].    I would like to thank you for being a loyal patient. If you do not mind, I would like to ask you some questions about your recent visit with us. Please feel free to reply if you wish to provide us with feedback on your visit with our practice.    First, could you tell me what went well with your recent visit?    Secondly, we are always looking for ways to make our patients' experiences even better. Do you have any recommendations on what we can do to improve your experience?    Finally, overall were you satisfied with your visit with us as a [facility] facility?    Over the next few days, you will be receiving a Patient Experience Survey. Please consider taking the survey, as it helps [facility] in improving their patient care.    Thank you for taking the time to answer our questions today.    I hope you have a good day.

## 2025-06-20 ENCOUNTER — RESULTS FOLLOW-UP (OUTPATIENT)
Age: 75
End: 2025-06-20
Payer: MEDICARE

## 2025-08-04 ENCOUNTER — TELEPHONE (OUTPATIENT)
Age: 75
End: 2025-08-04
Payer: MEDICARE

## 2025-08-05 ENCOUNTER — TELEPHONE (OUTPATIENT)
Age: 75
End: 2025-08-05
Payer: MEDICARE

## (undated) DEVICE — DEV INFL MONARCH 25W

## (undated) DEVICE — GUIDELINER CATHETERS ARE INTENDED TO BE USED IN CONJUNCTION WITH GUIDE CATHETERS TO ACCESS DISCRETE REGIONS OF THE CORONARY AND/OR PERIPHERAL VASCULATURE, AND TO FACILITATE PLACEMENT OF INTERVENTIONAL DEVICES.: Brand: GUIDELINER® V3 CATHETER

## (undated) DEVICE — PK CATH CARD 10

## (undated) DEVICE — GLIDESHEATH SLENDER STAINLESS STEEL KIT: Brand: GLIDESHEATH SLENDER

## (undated) DEVICE — ADULT, W/LG. BACK PAD, RADIOTRANSPARENT ELEMENT AND LEAD WIRE COMPATIBLE W/: Brand: DEFIBRILLATION ELECTRODES

## (undated) DEVICE — GW PERIPH GUIDERIGHT STD/EXCHNG/J/TIP SS 0.035IN 5X260CM

## (undated) DEVICE — CATH DIAG IMPULSE IM 5F 100CM

## (undated) DEVICE — TREK CORONARY DILATATION CATHETER 3.0 MM X 12 MM / RAPID-EXCHANGE: Brand: TREK

## (undated) DEVICE — RADIFOCUS OPTITORQUE ANGIOGRAPHIC CATHETER: Brand: OPTITORQUE

## (undated) DEVICE — MODEL AT P65, P/N 701554-001KIT CONTENTS: HAND CONTROLLER, 3-WAY HIGH-PRESSURE STOPCOCK WITH ROTATING END AND PREMIUM HIGH-PRESSURE TUBING: Brand: ANGIOTOUCH® KIT

## (undated) DEVICE — TREK CORONARY DILATATION CATHETER 2.50 MM X 12 MM / RAPID-EXCHANGE: Brand: TREK

## (undated) DEVICE — Device: Brand: ASAHI SION BLUE

## (undated) DEVICE — RADIFOCUS GLIDEWIRE: Brand: GLIDEWIRE

## (undated) DEVICE — BALN EUPHORA 2.5X12MM

## (undated) DEVICE — MODEL BT2000 P/N 700287-012KIT CONTENTS: MANIFOLD WITH SALINE AND CONTRAST PORTS, SALINE TUBING WITH SPIKE AND HAND SYRINGE, TRANSDUCER: Brand: BT2000 AUTOMATED MANIFOLD KIT

## (undated) DEVICE — MINI TREK CORONARY DILATATION CATHETER 2.0 MM X 12 MM / RAPID-EXCHANGE: Brand: MINI TREK

## (undated) DEVICE — CATH DIAG EXPO .045 PIG 5F 110CM

## (undated) DEVICE — HEARTRAIL III GUIDING CATHETER: Brand: HEARTRAIL